# Patient Record
Sex: FEMALE | Race: WHITE
[De-identification: names, ages, dates, MRNs, and addresses within clinical notes are randomized per-mention and may not be internally consistent; named-entity substitution may affect disease eponyms.]

---

## 2023-08-04 ENCOUNTER — HOSPITAL ENCOUNTER (EMERGENCY)
Dept: HOSPITAL 47 - EC | Age: 33
Discharge: HOME | End: 2023-08-04
Payer: COMMERCIAL

## 2023-08-04 VITALS
SYSTOLIC BLOOD PRESSURE: 115 MMHG | TEMPERATURE: 98.1 F | HEART RATE: 81 BPM | RESPIRATION RATE: 16 BRPM | DIASTOLIC BLOOD PRESSURE: 78 MMHG

## 2023-08-04 DIAGNOSIS — M79.89: ICD-10-CM

## 2023-08-04 DIAGNOSIS — F17.200: ICD-10-CM

## 2023-08-04 DIAGNOSIS — F15.90: ICD-10-CM

## 2023-08-04 DIAGNOSIS — F11.90: ICD-10-CM

## 2023-08-04 DIAGNOSIS — M79.641: Primary | ICD-10-CM

## 2023-08-04 DIAGNOSIS — D72.829: ICD-10-CM

## 2023-08-04 DIAGNOSIS — M79.642: ICD-10-CM

## 2023-08-04 LAB
ALBUMIN SERPL-MCNC: 4.5 G/DL (ref 3.5–5)
ALP SERPL-CCNC: 59 U/L (ref 38–126)
ALT SERPL-CCNC: 101 U/L (ref 4–34)
ANION GAP SERPL CALC-SCNC: 9 MMOL/L
AST SERPL-CCNC: 81 U/L (ref 14–36)
BASOPHILS # BLD AUTO: 0.1 K/UL (ref 0–0.2)
BASOPHILS NFR BLD AUTO: 0 %
BUN SERPL-SCNC: 13 MG/DL (ref 7–17)
CALCIUM SPEC-MCNC: 8.9 MG/DL (ref 8.4–10.2)
CHLORIDE SERPL-SCNC: 102 MMOL/L (ref 98–107)
CO2 SERPL-SCNC: 27 MMOL/L (ref 22–30)
EOSINOPHIL # BLD AUTO: 0.1 K/UL (ref 0–0.7)
EOSINOPHIL NFR BLD AUTO: 1 %
ERYTHROCYTE [DISTWIDTH] IN BLOOD BY AUTOMATED COUNT: 4.2 M/UL (ref 3.8–5.4)
ERYTHROCYTE [DISTWIDTH] IN BLOOD: 13.1 % (ref 11.5–15.5)
GLUCOSE SERPL-MCNC: 83 MG/DL (ref 74–99)
HCT VFR BLD AUTO: 38.5 % (ref 34–46)
HGB BLD-MCNC: 13 GM/DL (ref 11.4–16)
LYMPHOCYTES # SPEC AUTO: 2.4 K/UL (ref 1–4.8)
LYMPHOCYTES NFR SPEC AUTO: 22 %
MCH RBC QN AUTO: 31 PG (ref 25–35)
MCHC RBC AUTO-ENTMCNC: 33.8 G/DL (ref 31–37)
MCV RBC AUTO: 91.6 FL (ref 80–100)
MONOCYTES # BLD AUTO: 0.7 K/UL (ref 0–1)
MONOCYTES NFR BLD AUTO: 7 %
NEUTROPHILS # BLD AUTO: 7.4 K/UL (ref 1.3–7.7)
NEUTROPHILS NFR BLD AUTO: 69 %
PLATELET # BLD AUTO: 304 K/UL (ref 150–450)
POTASSIUM SERPL-SCNC: 3.8 MMOL/L (ref 3.5–5.1)
PROT SERPL-MCNC: 7.6 G/DL (ref 6.3–8.2)
SODIUM SERPL-SCNC: 138 MMOL/L (ref 137–145)
WBC # BLD AUTO: 10.8 K/UL (ref 3.8–10.6)

## 2023-08-04 PROCEDURE — 80053 COMPREHEN METABOLIC PANEL: CPT

## 2023-08-04 PROCEDURE — 96372 THER/PROPH/DIAG INJ SC/IM: CPT

## 2023-08-04 PROCEDURE — 85025 COMPLETE CBC W/AUTO DIFF WBC: CPT

## 2023-08-04 PROCEDURE — 83605 ASSAY OF LACTIC ACID: CPT

## 2023-08-04 PROCEDURE — 86140 C-REACTIVE PROTEIN: CPT

## 2023-08-04 PROCEDURE — 36415 COLL VENOUS BLD VENIPUNCTURE: CPT

## 2023-08-04 PROCEDURE — 99283 EMERGENCY DEPT VISIT LOW MDM: CPT

## 2023-08-04 PROCEDURE — 73130 X-RAY EXAM OF HAND: CPT

## 2023-08-04 NOTE — XR
EXAMINATION TYPE: XR hand complete bilateral

 

DATE OF EXAM: 8/4/2023

 

COMPARISON: NONE

 

HISTORY: Pain

 

TECHNIQUE: Three views are submitted.

 

FINDINGS:

The osseous structures are intact.  The joint spaces are preserved and there is no acute fracture or 
dislocation. There is bilateral soft tissue.  

 

IMPRESSION:

1.  No definite acute fracture or dislocation if symptoms persist, follow-up study in 7 to 10 days wo
uld be suggested. There is evidence of soft tissue edema.

## 2023-08-04 NOTE — ED
Extremity Problem HPI





- General


Chief complaint: Extremity Problem,Nontraumatic


Stated complaint: Swollen Hands


Time Seen by Provider: 23 13:33


Source: patient, RN notes reviewed


Mode of arrival: ambulatory


Limitations: no limitations





- History of Present Illness


Initial comments: 


This is a 33-year-old female who presents to the emergency department for 

bilateral hand pain and swelling.  Patient states that this started yesterday.  

She was given a dose of steroids and started on Prednisone 20mg BID at [facility], where she is currently being treated for polysubstance abuse. States that

this morning, they became much worse.  This has happened to her before, however 

it was when she is using IV drugs and acquired an infection, requiring hosp

italization and IV antibiotics.  She does not use any IV drugs currently and 

denies any injuries or abrasions that she can think of, that would've resulted 

in an infection.  Denies any fevers or chills.  But states that this is very 

painful,





Denies any fevers, chills, sore throat, cough, dyspnea, chest pain, 

palpitations, abdominal pain, nausea, vomiting, diarrhea, back pain, or headach

es.





MD Complaint: extremity pain, extremity swelling


Onset/Timin


-: days(s)





- Related Data


                                  Previous Rx's











 Medication  Instructions  Recorded


 


Cephalexin [Keflex] 500 mg PO Q6HR 7 Days #28 cap 23


 


Sulfamethox-Tmp 800-160Mg [Bactrim 1 tab PO Q12HR 7 Days #14 tab 23





-160 mg]  











                                    Allergies











Allergy/AdvReac Type Severity Reaction Status Date / Time


 


No Known Allergies Allergy   Verified 23 12:25














Review of Systems


ROS Statement: 


Those systems with pertinent positive or pertinent negative responses have been 

documented in the HPI.





ROS Other: All systems not noted in ROS Statement are negative.





Past Medical History


Past Medical History: GERD/Reflux


History of Any Multi-Drug Resistant Organisms: None Reported


Past Surgical History: No Surgical Hx Reported


Past Psychological History: No Psychological Hx Reported


Smoking Status: Current every day smoker


Past Alcohol Use History: None Reported


Past Drug Use History: Heroin, IV Drug Use, Methamphetamine





General Exam


Limitations: no limitations


General appearance: alert, in no apparent distress


Head exam: Present: atraumatic, normocephalic, normal inspection


Respiratory exam: Present: normal lung sounds bilaterally.  Absent: respiratory 

distress, wheezes, rales, rhonchi, stridor


Cardiovascular Exam: Present: regular rate, normal rhythm, normal heart sounds. 

Absent: systolic murmur, diastolic murmur, rubs, gallop, clicks


Extremities exam: Present: other (Erythema, swelling, tenderness, and increased 

heat to the bilateral hands.  Limited range of motion of all digits secondary to

pain. 2+ radial pulses. Capillary refill <1 second.)


Neurological exam: Present: alert, oriented X3, CN II-XII intact


Psychiatric exam: Present: normal affect, normal mood





Course


                                   Vital Signs











  23





  12:20 14:25 16:26


 


Temperature 98 F 98 F 98.1 F


 


Pulse Rate 103 H 89 81


 


Respiratory 18 18 16





Rate   


 


Blood Pressure 114/70 120/82 115/78


 


O2 Sat by Pulse 97 98 98





Oximetry   














Medical Decision Making





- Medical Decision Making


This is a 33-year-old female who presents to the emergency department for 

bilateral hand pain and swelling.





Was pt. sent in by a medical professional or institution?


@  -[facility]


Did you speak to anyone other than the patient for history?  


@  -No


Did you review nursing and triage notes? 


@  -Yes, and I agree, it is accurate with regards to the patient's symptoms.


Were old charts reviewed? 


@  -No


Differential Diagnosis? 


@  -Hand Pain/Swelling:


Cellulitis, abscess, septic arthritis, OA, RA, gout, this is not meant to be an 

all-inclusive list. 


EKG interpreted by me (3pts min.)?


@  -Not obtained


X-rays interpreted by me (1pt min.)?


@  -X-ray of the bilateral hands obtained.  My interpretation identifies soft 

tissue swelling.


CT interpreted by me (1pt min.)?


@  -Not obtained


U/S interpreted by me (1pt. min.)?


@  -Not obtained


What testing was considered but not performed? (CT, X-rays, U/S, labs)? Why?


@  -None


What meds were considered but not given? Why?


@  -None


Did you discuss the management of the patient with other professionals?


@  -No


Did you reconcile home meds?


@  -No


Was smoking cessation discussed for >3mins.?


@  -No


Was critical care preformed (if so, how long)?


@  -No


Were there social determinants of health that impacted care today? How? 

(Homelessness, low income, unemployed, alcoholism, drug addiction, 

transportation, low edu. Level, literacy, decrease access to med. care, [facility], 

rehab)?


@  -Polysubstance abuse, increasing the patient's risk for health problems and 

contributing to overall worsening of health status.


Was there de-escalation of care discussed even if they declined? (Discuss DNR or

withdrawal of care, Hospice)?


@  -No


What co-morbidities impacted this encounter? (DM, HTN, Smoking, COPD, CAD, 

Cancer, CVA, Hep., AIDS, mental health diagnosis, sleep apnea, morbid obesity)?


@  -Polysubstance abuse


Was patient admitted / discharged?


@  -Discharged.  Lab work obtained revealing very mild leukocytosis, and was 

otherwise nonactionable.  X-ray obtained revealing soft tissue swelling without 

other acute process.  The cause of her symptoms is not entirely clear, 

especially in light of the unremarkable blood work.  She was given a dose of IM 

Decadron in the emergency department, and will continue with the prednisone she 

is currently prescribed at [facility].  Prescription for Bactrim and Keflex 

provided with dosing instructions reviewed for possible infectious component.  

She was given very strict return parameters and her status will continue to be 

monitored closely by [facility].  Advised ibuprofen and Tylenol as needed for 

pain relief.  Patient discharged back into the care of [facility].


Undiagnosed new problem with uncertain prognosis?


@  -None


Drug Therapy requiring intensive monitoring for toxicity (Heparin, Nitro, 

Insulin, Cardizem)?


@  -None


Were any procedures done?


@  -None


Diagnosis/symptom?


@  -Bilateral hand pain and swelling


Acute, or Chronic, or Acute on Chronic?


@  -Acute


Uncomplicated (without systemic symptoms) or Complicated (systemic symptoms)?


@  -Uncomplicated


Side effects of treatment?


@  -None


Exacerbation, Progression, or Severe Exacerbation]


@  -Not applicable


Poses a threat to life or bodily function?


@  -No





Return precautions reviewed in depth, the patient is instructed to return to the

emergency department with any new, worsening, or concerning symptoms. Patient v

erbalized understanding. 





This case was discussed in detail with the attending ED physician, Dr. Roca.

Presentation, findings, and treatment plan discussed in detail as well. 








- Lab Data


Result diagrams: 


                                 23 13:52





                                 23 13:52


                                   Lab Results











  23 Range/Units





  13:52 13:52 13:52 


 


WBC  10.8 H    (3.8-10.6)  k/uL


 


RBC  4.20    (3.80-5.40)  m/uL


 


Hgb  13.0    (11.4-16.0)  gm/dL


 


Hct  38.5    (34.0-46.0)  %


 


MCV  91.6    (80.0-100.0)  fL


 


MCH  31.0    (25.0-35.0)  pg


 


MCHC  33.8    (31.0-37.0)  g/dL


 


RDW  13.1    (11.5-15.5)  %


 


Plt Count  304    (150-450)  k/uL


 


MPV  7.6    


 


Neutrophils %  69    %


 


Lymphocytes %  22    %


 


Monocytes %  7    %


 


Eosinophils %  1    %


 


Basophils %  0    %


 


Neutrophils #  7.4    (1.3-7.7)  k/uL


 


Lymphocytes #  2.4    (1.0-4.8)  k/uL


 


Monocytes #  0.7    (0-1.0)  k/uL


 


Eosinophils #  0.1    (0-0.7)  k/uL


 


Basophils #  0.1    (0-0.2)  k/uL


 


Sodium   138   (137-145)  mmol/L


 


Potassium   3.8   (3.5-5.1)  mmol/L


 


Chloride   102   ()  mmol/L


 


Carbon Dioxide   27   (22-30)  mmol/L


 


Anion Gap   9   mmol/L


 


BUN   13   (7-17)  mg/dL


 


Creatinine   0.55   (0.52-1.04)  mg/dL


 


Est GFR (CKD-EPI)AfAm   >90   (>60 ml/min/1.73 sqM)  


 


Est GFR (CKD-EPI)NonAf   >90   (>60 ml/min/1.73 sqM)  


 


Glucose   83   (74-99)  mg/dL


 


Plasma Lactic Acid Kael    1.5  (0.7-2.0)  mmol/L


 


Calcium   8.9   (8.4-10.2)  mg/dL


 


Total Bilirubin   0.2   (0.2-1.3)  mg/dL


 


AST   81 H   (14-36)  U/L


 


ALT   101 H   (4-34)  U/L


 


Alkaline Phosphatase   59   ()  U/L


 


C-Reactive Protein   <0.5   (<1.0)  mg/dL


 


Total Protein   7.6   (6.3-8.2)  g/dL


 


Albumin   4.5   (3.5-5.0)  g/dL














- Radiology Data


Radiology results: report reviewed, image reviewed





Disposition


Clinical Impression: 


 Bilateral hand swelling





Disposition: HOME SELF-CARE


Instructions (If sedation given, give patient instructions):  Arthralgia (ED)


Additional Instructions: 


Return to the emergency department with any new, worsening, or concerning 

symptoms.  Take the Bactrim and Keflex as prescribed for 7 days.  Consider 

taking a probiotic, given the amount of antibiotics you have been on recently.  

Continue to take the steroid as prescribed by [facility].  Follow up with your

primary care provider in 1-2 days.


Prescriptions: 


Sulfamethox-Tmp 800-160Mg [Bactrim -160 mg] 1 tab PO Q12HR 7 Days #14 tab


Cephalexin [Keflex] 500 mg PO Q6HR 7 Days #28 cap


Is patient prescribed a controlled substance at d/c from ED?: No


Referrals: 


None,Stated [Primary Care Provider] - 1-2 days

## 2024-12-08 ENCOUNTER — HOSPITAL ENCOUNTER (EMERGENCY)
Dept: HOSPITAL 47 - EC | Age: 34
Discharge: HOME | End: 2024-12-08
Payer: COMMERCIAL

## 2024-12-08 VITALS — SYSTOLIC BLOOD PRESSURE: 136 MMHG | TEMPERATURE: 98 F | HEART RATE: 102 BPM | DIASTOLIC BLOOD PRESSURE: 80 MMHG

## 2024-12-08 VITALS — RESPIRATION RATE: 18 BRPM

## 2024-12-08 DIAGNOSIS — F17.200: ICD-10-CM

## 2024-12-08 DIAGNOSIS — R22.33: Primary | ICD-10-CM

## 2024-12-08 LAB
ALBUMIN SERPL-MCNC: 4.5 G/DL (ref 3.5–5)
ALP SERPL-CCNC: 61 U/L (ref 38–126)
ALT SERPL-CCNC: 61 U/L (ref 4–34)
ANION GAP SERPL CALC-SCNC: 5 MMOL/L
AST SERPL-CCNC: 36 U/L (ref 14–36)
BASOPHILS # BLD AUTO: 0.1 K/UL (ref 0–0.2)
BASOPHILS NFR BLD AUTO: 1 %
BUN SERPL-SCNC: 14 MG/DL (ref 7–17)
CALCIUM SPEC-MCNC: 8.9 MG/DL (ref 8.4–10.2)
CHLORIDE SERPL-SCNC: 105 MMOL/L (ref 98–107)
CO2 SERPL-SCNC: 28 MMOL/L (ref 22–30)
EOSINOPHIL # BLD AUTO: 0.3 K/UL (ref 0–0.7)
EOSINOPHIL NFR BLD AUTO: 4 %
ERYTHROCYTE [DISTWIDTH] IN BLOOD BY AUTOMATED COUNT: 4.37 M/UL (ref 3.8–5.4)
ERYTHROCYTE [DISTWIDTH] IN BLOOD: 14.6 % (ref 11.5–15.5)
GLUCOSE SERPL-MCNC: 96 MG/DL (ref 74–99)
HCT VFR BLD AUTO: 37.9 % (ref 34–46)
HGB BLD-MCNC: 12.9 GM/DL (ref 11.4–16)
LYMPHOCYTES # SPEC AUTO: 2.2 K/UL (ref 1–4.8)
LYMPHOCYTES NFR SPEC AUTO: 24 %
MAGNESIUM SPEC-SCNC: 1.9 MG/DL (ref 1.6–2.3)
MCH RBC QN AUTO: 29.5 PG (ref 25–35)
MCHC RBC AUTO-ENTMCNC: 34 G/DL (ref 31–37)
MCV RBC AUTO: 86.8 FL (ref 80–100)
MONOCYTES # BLD AUTO: 0.4 K/UL (ref 0–1)
MONOCYTES NFR BLD AUTO: 5 %
NEUTROPHILS # BLD AUTO: 5.9 K/UL (ref 1.3–7.7)
NEUTROPHILS NFR BLD AUTO: 65 %
PLATELET # BLD AUTO: 352 K/UL (ref 150–450)
POTASSIUM SERPL-SCNC: 4.2 MMOL/L (ref 3.5–5.1)
PROT SERPL-MCNC: 7 G/DL (ref 6.3–8.2)
SODIUM SERPL-SCNC: 138 MMOL/L (ref 137–145)
WBC # BLD AUTO: 9.1 K/UL (ref 3.8–10.6)

## 2024-12-08 PROCEDURE — 86140 C-REACTIVE PROTEIN: CPT

## 2024-12-08 PROCEDURE — 80053 COMPREHEN METABOLIC PANEL: CPT

## 2024-12-08 PROCEDURE — 85025 COMPLETE CBC W/AUTO DIFF WBC: CPT

## 2024-12-08 PROCEDURE — 83605 ASSAY OF LACTIC ACID: CPT

## 2024-12-08 PROCEDURE — 83735 ASSAY OF MAGNESIUM: CPT

## 2024-12-08 PROCEDURE — 99283 EMERGENCY DEPT VISIT LOW MDM: CPT

## 2024-12-08 PROCEDURE — 36415 COLL VENOUS BLD VENIPUNCTURE: CPT

## 2024-12-08 PROCEDURE — 84443 ASSAY THYROID STIM HORMONE: CPT

## 2024-12-08 NOTE — ED
Extremity Problem HPI





- General


Chief complaint: Skin/Abscess/Foreign Body


Stated complaint: Swollen hands


Time Seen by Provider: 12/08/24 16:46


Source: patient, RN notes reviewed


Mode of arrival: ambulatory


Limitations: no limitations





- History of Present Illness


Initial comments: 


This is a 34-year-old female who presents to the emergency department for 

bilateral hand pain and swelling.  States that this has been an ongoing issue 

for at least 10 years.  She was first diagnosed with rheumatoid arthritis and is

now being worked up for Raynaud's.  States that she had been evaluated by 

vascular surgery who cleared her medically so she could go to [facility].  

However, [facility] saw her hands and then sent her here for further 

evaluation.  Patient states that her hands look the same as they always do.  

These are not particularly painful.  The swelling also never moves past the 

hands.





MD Complaint: extremity swelling





- Related Data


                                  Previous Rx's











 Medication  Instructions  Recorded


 


Cephalexin [Keflex] 500 mg PO Q6HR 7 Days #28 cap 08/04/23


 


Sulfamethox-Tmp 800-160Mg [Bactrim 1 tab PO Q12HR 7 Days #14 tab 08/04/23





-160 mg]  











                                    Allergies











Allergy/AdvReac Type Severity Reaction Status Date / Time


 


No Known Allergies Allergy   Verified 12/08/24 16:39














Review of Systems


ROS Statement: 


Those systems with pertinent positive or pertinent negative responses have been 

documented in the HPI.





ROS Other: All systems not noted in ROS Statement are negative.





Past Medical History


Past Medical History: GERD/Reflux


History of Any Multi-Drug Resistant Organisms: None Reported


Past Surgical History: No Surgical Hx Reported


Past Psychological History: No Psychological Hx Reported


Smoking Status: Current every day smoker


Past Alcohol Use History: None Reported


Past Drug Use History: Heroin, IV Drug Use, Methamphetamine





General Exam


Limitations: no limitations


General appearance: alert, in no apparent distress


Head exam: Present: atraumatic, normocephalic, normal inspection


Respiratory exam: Present: normal lung sounds bilaterally.  Absent: respiratory 

distress, wheezes, rales, rhonchi, stridor


Cardiovascular Exam: Present: regular rate, normal rhythm, normal heart sounds. 

Absent: systolic murmur, diastolic murmur, rubs, gallop, clicks


Extremities exam: Present: other (Diffuse swelling to the bilateral hands with 

overlying erythema.  No tenderness.  Minor warmth bilaterally. 2+ radial pulses 

and capillary refill less than 1 second.)


Neurological exam: Present: alert, oriented X3, CN II-XII intact


Psychiatric exam: Present: normal affect, normal mood


Skin exam: Present: warm, dry, intact, normal color.  Absent: rash





Course


                                   Vital Signs











  12/08/24 12/08/24 12/08/24





  16:36 18:40 18:55


 


Temperature 98.2 F  98.0 F


 


Pulse Rate 135 H 103 H 102 H


 


Respiratory 16 18 18





Rate   


 


Blood Pressure 139/84 128/82 136/80


 


O2 Sat by Pulse 100 99 99





Oximetry   














Medical Decision Making





- Medical Decision Making


This is a 34-year-old female who presents to the emergency department for 

bilateral hand pain and swelling.





Was pt. sent in by a medical professional or institution?


@  -No   


Did you speak to anyone other than the patient for history?  


@  -No


Did you review nursing and triage notes? 


@  -Yes, and I agree, it is accurate with regards to the patient's symptoms.


Were old charts reviewed? 


@  -No


Differential Diagnosis? 


@  -Differential Musculoskeletal


Muscular strain, contusion, ligament sprain, fracture, arthritis, septic 

arthritis, bursitis, cellulitis, muscle spasm, nerve compression, DVT, arterial 

occlusion, herpes zoster, electrolyte abnormality, tumor.... This is not meant 

to be in all inclusive list


EKG interpreted by me (3pts min.)?


@  -Not obtained


X-rays interpreted by me (1pt min.)?


@  -Not obtained


CT interpreted by me (1pt min.)?


@  -Not obtained


U/S interpreted by me (1pt. min.)?


@  -Not obtained


What testing was considered but not performed? (CT, X-rays, U/S, labs)? Why?


@  -None


What meds were considered but not given? Why?


@  -None


Did you discuss the management of the patient with other professionals?


@  -No


Did you reconcile home meds?


@  -No


Was smoking cessation discussed for >3mins.?


@  -No


Was critical care preformed (if so, how long)?


@  -No


Were there social determinants of health that impacted care today? How? 

(Homelessness, low income, unemployed, alcoholism, drug addiction, transp

ortation, low edu. Level, literacy, decrease access to med. care, longterm, rehab)?


@  -Rehab, who sent the patient over for evaluation


Was there de-escalation of care discussed even if they declined? (Discuss DNR or

withdrawal of care, Hospice)?


@  -No


What co-morbidities impacted this encounter? (DM, HTN, Smoking, COPD, CAD, 

Cancer, CVA, Hep., AIDS, mental health diagnosis, sleep apnea, morbid obesity)?


@  -History of drug abuse


Was patient admitted / discharged?


@  -Discharged.  Lab work unremarkable.  She did have diffuse swelling of the 

hands with some erythema.  Patient reports that this is a chronic presentation 

with the Raynaud's.  She did not have any pain.  She was neurovascularly intact 

and otherwise had no complaints.  Given the unremarkable workup and chronic 

nature of this issue, she was discharged back to [facility].  She will 

otherwise follow up with her PCP and vascular surgeon.  Case discussed with ED 

attending Dr. Willis.





Return precautions reviewed in depth, the patient is instructed to return to the

emergency department with any new, worsening, or concerning symptoms. Patient 

verbalized understanding.


Undiagnosed new problem with uncertain prognosis?


@  -None


Drug Therapy requiring intensive monitoring for toxicity (Heparin, Nitro, 

Insulin, Cardizem)?


@  -None


Were any procedures done?


@  -None


Diagnosis/symptom?


@  -Bilateral hand swelling


Acute, or Chronic, or Acute on Chronic?


@  -Chronic


Uncomplicated (without systemic symptoms) or Complicated (systemic symptoms)?


@  -Uncomplicated


Side effects of treatment?


@  -None


Exacerbation, Progression, or Severe Exacerbation]


@  -Stable


Poses a threat to life or bodily function?


@  -No








- Lab Data


Result diagrams: 


                                 12/08/24 17:43





                                 12/08/24 17:43


                                   Lab Results











  12/08/24 12/08/24 12/08/24 Range/Units





  17:43 17:43 17:43 


 


WBC  9.1    (3.8-10.6)  k/uL


 


RBC  4.37    (3.80-5.40)  m/uL


 


Hgb  12.9    (11.4-16.0)  gm/dL


 


Hct  37.9    (34.0-46.0)  %


 


MCV  86.8    (80.0-100.0)  fL


 


MCH  29.5    (25.0-35.0)  pg


 


MCHC  34.0    (31.0-37.0)  g/dL


 


RDW  14.6    (11.5-15.5)  %


 


Plt Count  352    (150-450)  k/uL


 


MPV  7.3    


 


Neutrophils %  65    %


 


Lymphocytes %  24    %


 


Monocytes %  5    %


 


Eosinophils %  4    %


 


Basophils %  1    %


 


Neutrophils #  5.9    (1.3-7.7)  k/uL


 


Lymphocytes #  2.2    (1.0-4.8)  k/uL


 


Monocytes #  0.4    (0-1.0)  k/uL


 


Eosinophils #  0.3    (0-0.7)  k/uL


 


Basophils #  0.1    (0-0.2)  k/uL


 


Sodium   138   (137-145)  mmol/L


 


Potassium   4.2   (3.5-5.1)  mmol/L


 


Chloride   105   ()  mmol/L


 


Carbon Dioxide   28   (22-30)  mmol/L


 


Anion Gap   5   mmol/L


 


BUN   14   (7-17)  mg/dL


 


Creatinine   0.69   (0.52-1.04)  mg/dL


 


Est GFR (CKD-EPI)AfAm   >90   (>60 ml/min/1.73 sqM)  


 


Est GFR (CKD-EPI)NonAf   >90   (>60 ml/min/1.73 sqM)  


 


Glucose   96   (74-99)  mg/dL


 


Plasma Lactic Acid Kael    0.8  (0.7-2.0)  mmol/L


 


Calcium   8.9   (8.4-10.2)  mg/dL


 


Magnesium   1.9   (1.6-2.3)  mg/dL


 


Total Bilirubin   0.3   (0.2-1.3)  mg/dL


 


AST   36   (14-36)  U/L


 


ALT   61 H   (4-34)  U/L


 


Alkaline Phosphatase   61   ()  U/L


 


C-Reactive Protein   <0.5   (<1.0)  mg/dL


 


Total Protein   7.0   (6.3-8.2)  g/dL


 


Albumin   4.5   (3.5-5.0)  g/dL


 


TSH   0.541   (0.465-4.680)  mIU/L














Disposition


Clinical Impression: 


 Bilateral hand swelling





Disposition: HOME SELF-CARE


Instructions (If sedation given, give patient instructions):  Raynaud Disease 

(ED)


Additional Instructions: 


Return to the emergency department with any new, worsening, or concerning 

symptoms. Follow up with your primary care provider in 1-2 days.


Is patient prescribed a controlled substance at d/c from ED?: No


Referrals: 


None,Stated [Primary Care Provider] - 1-2 days


Time of Disposition: 18:48